# Patient Record
Sex: FEMALE | Race: ASIAN | URBAN - METROPOLITAN AREA
[De-identification: names, ages, dates, MRNs, and addresses within clinical notes are randomized per-mention and may not be internally consistent; named-entity substitution may affect disease eponyms.]

---

## 2020-11-16 ENCOUNTER — TELEPHONE ENCOUNTER (OUTPATIENT)
Dept: URBAN - METROPOLITAN AREA CLINIC 92 | Facility: CLINIC | Age: 65
End: 2020-11-16

## 2020-11-16 ENCOUNTER — DASHBOARD ENCOUNTERS (OUTPATIENT)
Age: 65
End: 2020-11-16

## 2020-11-16 ENCOUNTER — OFFICE VISIT (OUTPATIENT)
Dept: URBAN - METROPOLITAN AREA CLINIC 78 | Facility: CLINIC | Age: 65
End: 2020-11-16
Payer: COMMERCIAL

## 2020-11-16 ENCOUNTER — WEB ENCOUNTER (OUTPATIENT)
Dept: URBAN - METROPOLITAN AREA CLINIC 78 | Facility: CLINIC | Age: 65
End: 2020-11-16

## 2020-11-16 DIAGNOSIS — K62.5 RECTAL BLEEDING: ICD-10-CM

## 2020-11-16 DIAGNOSIS — K63.89 MELANOSIS COLI: ICD-10-CM

## 2020-11-16 DIAGNOSIS — R19.8 DEFECATION SYMPTOM: ICD-10-CM

## 2020-11-16 PROBLEM — 85920003 CONSTIPATION BY OUTLET OBSTRUCTION: Status: ACTIVE | Noted: 2020-11-16

## 2020-11-16 PROCEDURE — 99203 OFFICE O/P NEW LOW 30 MIN: CPT | Performed by: INTERNAL MEDICINE

## 2020-11-16 PROCEDURE — G8482 FLU IMMUNIZE ORDER/ADMIN: HCPCS | Performed by: INTERNAL MEDICINE

## 2020-11-16 PROCEDURE — 99243 OFF/OP CNSLTJ NEW/EST LOW 30: CPT | Performed by: INTERNAL MEDICINE

## 2020-11-16 RX ORDER — SODIUM, POTASSIUM,MAG SULFATES 17.5-3.13G
177 ML DAY 1 AND 177 ML DAY 2 SOLUTION, RECONSTITUTED, ORAL ORAL
Qty: 354 MILLILITER | Refills: 0 | OUTPATIENT

## 2020-11-16 RX ORDER — SODIUM, POTASSIUM,MAG SULFATES 17.5-3.13G
177 ML DAY 1 AND 177 ML DAY 2 SOLUTION, RECONSTITUTED, ORAL ORAL
Qty: 354 MILLILITER | Refills: 0 | OUTPATIENT
Start: 2020-11-16

## 2020-11-16 NOTE — HPI-TODAY'S VISIT:
Patient elicits rectal bleeding intermittent BRBPR onset last year .  Discused with PCP who asked her to keep and eye  Consistency of blood: BRBPR  Pt spends 20-30 mts in restroom  Any time she strains she see BRBPR in toilet and on wiping In last few months , the bleeding freq has increased especially after she straing . It can last 2-3 days  Last colonoscopy was in 2011 .. revealed Melanosis Coli  She catergorically  denies laxative use and herbal meds   She spends 5 minutes nothing happens and then she has to strain and push . She always never have to go but she tries to go ..and then she has reportedly have a BM   Denies bladder or bowel incontinence or prolapse  She does feel hemorrhoids externallly  Gynae exam is normal annually  Pt reports two vaginal deliveries   Any Family history of colon polyps or colon cancer or inflammatory bowel disease  Back pain not on NSAIDS  Denies chest pain or SOB or fevers

## 2020-12-22 ENCOUNTER — OFFICE VISIT (OUTPATIENT)
Dept: URBAN - METROPOLITAN AREA SURGERY CENTER 15 | Facility: SURGERY CENTER | Age: 65
End: 2020-12-22
Payer: COMMERCIAL

## 2020-12-22 DIAGNOSIS — K62.5 ANAL BLEEDING: ICD-10-CM

## 2020-12-22 PROCEDURE — G8907 PT DOC NO EVENTS ON DISCHARG: HCPCS | Performed by: INTERNAL MEDICINE

## 2020-12-22 PROCEDURE — 45378 DIAGNOSTIC COLONOSCOPY: CPT | Performed by: INTERNAL MEDICINE

## 2020-12-22 PROCEDURE — G9937 DIG OR SURV COLSCO: HCPCS | Performed by: INTERNAL MEDICINE
